# Patient Record
(demographics unavailable — no encounter records)

---

## 2020-03-30 NOTE — PHYS DOC
Past Medical History


Past Medical History:  Asthma, Bronchitis, Sinusitis


Past Surgical History:  No Surgical History


Smoking Status:  Current Every Day Smoker


Alcohol Use:  Occasionally


Drug Use:  None





Adult General


Chief Complaint


Chief Complaint:  HEADACHE





HPI


HPI





49-year-old female presents with report of headache 2 days. Reports has a 

history of mild headaches but never to this extent. Patient has tried 

over-the-counter remedies without improvement. Patient denies any fever or 

chills. Reports pain is primarily behind her right eye and radiates backwards. 

Denies visual changes. Denies neck pain. Denies pregnancy. Reports last 

menstrual period one week ago.





Review of Systems


Review of Systems





Constitutional: Denies fever or chills 


Eyes: Denies redness or eye pain 


HENT: Denies nasal congestion or sore throat


Respiratory: Denies cough or shortness of breath 


Cardiovascular: Denies chest pain or palpitations


GI: Denies abdominal pain, nausea, or vomiting


: Denies dysuria or hematuria


Musculoskeletal: Denies back pain or joint pain


Integument: Denies rash or skin lesions 


Neurologic: Reports headache; denies focal weakness or sensory changes





Complete systems were reviewed and found to be within normal limits, except as 

documented in this note.





Current Medications


Current Medications





Current Medications








 Medications


  (Trade)  Dose


 Ordered  Sig/Kareem  Start Time


 Stop Time Status Last Admin


Dose Admin


 


 Dexamethasone


 Sodium Phosphate


  (Decadron)  10 mg  1X  ONCE  3/30/20 01:00


 3/30/20 01:01 DC 3/30/20 01:48


10 MG


 


 Diphenhydramine


 HCl


  (Benadryl)  50 mg  1X  ONCE  3/30/20 01:00


 3/30/20 01:01 DC 3/30/20 01:49


50 MG


 


 Ketorolac


 Tromethamine


  (Toradol 15mg


 Vial)  15 mg  1X  ONCE  3/30/20 01:00


 3/30/20 01:01 DC 3/30/20 01:50


15 MG


 


 Metoclopramide HCl


  (Reglan Vial)  10 mg  1X  ONCE  3/30/20 01:00


 3/30/20 01:01 DC 3/30/20 01:51


10 MG


 


 Sodium Chloride  1,000 ml @ 


 1,000 mls/hr  1X  ONCE  3/30/20 01:00


 3/30/20 01:59 DC 3/30/20 01:30


1,000 MLS/HR











Allergies


Allergies





Allergies








Coded Allergies Type Severity Reaction Last Updated Verified


 


  Penicillins Allergy Intermediate ITCHY RASH 3/30/20 Yes


 


  clarithromycin Allergy Intermediate ITCHY RASH 3/30/20 Yes











Physical Exam


Physical Exam





Constitutional: Well developed, well nourished, uncomfortable, non-toxic 

appearance


HENT: Normocephalic, atraumatic, oropharynx moist


Eyes: PERRL, EOMI, conjunctiva normal, no discharge, no nystagmus


Neck: Normal range of motion, no tenderness, supple, no meningeal signs


Cardiovascular: Heart rate normal, regular rhythm


Lungs & Thorax:  Bilateral breath sounds clear to auscultation, no wheezing


Abdomen: Soft, no tenderness


Skin: Warm, dry, no erythema, no rash


Extremities: No tenderness, ROM intact, no edema


Neurologic: Alert and oriented X 3, normal motor function, normal sensory 

function, no focal deficits noted


Psychologic: Affect normal, judgment normal





Current Patient Data


Vital Signs





                                   Vital Signs








  Date Time  Temp Pulse Resp B/P (MAP) Pulse Ox O2 Delivery O2 Flow Rate FiO2


 


3/30/20 00:00 98.4 76 16 159/86 (110) 96 Room Air  





 98.4       











EKG


EKG


[]





Radiology/Procedures


Radiology/Procedures


[]





Course & Med Decision Making


Course & Med Decision Making





Patient presents with intractable headache. Patient neurologically intact. No 

history of trauma. Afebrile. No meningeal signs. Symptomatic treatment provided 

with interval improvement.





Patient stable for discharge with outpatient follow-up with PCP/neurologist. 

Neurology referral provided. Discussed findings and plan with patient, who 

acknowledges understanding and agreement.





Dragon Disclaimer


Dragon Disclaimer


This electronic medical record was generated, in whole or in part, using a voice

 recognition dictation system.





Departure


Departure


Impression:  


   Primary Impression:  


   Headache


Disposition:  01 HOME, SELF-CARE


Condition:  STABLE


Referrals:  


UNKNOWN PCP NAME (PCP)








PRINCE ORTIZ MD


Patient Instructions:  Headache, FAQs


Scripts


Butalb/Acetaminophen/Caffeine ([id number] -40) 1 Each Tablet


1 EACH PO Q6HRS PRN for HEADACHE, #14 TAB


   Prov: JESUS CODY DO         3/30/20





Problem Qualifiers








   Primary Impression:  


   Headache


   Headache type:  unspecified  Headache chronicity pattern:  acute headache  

   Intractability:  intractable  Qualified Codes:  R51 - Headache








JESUS CODY DO             Mar 30, 2020 02:20

## 2020-09-13 NOTE — PHYS DOC
Past Medical History


Past Medical History:  No Pertinent History, Bronchitis


Past Surgical History:  No Surgical History


Smoking Status:  Current Every Day Smoker


Alcohol Use:  Occasionally


Drug Use:  None





General Adult


EDM:


Chief Complaint:  Congestion





HPI:


HPI:





Patient is a 49  year old female with a history of bronchitis, current smoker 

who presents to the ED today complaining of nasal congestion and enlarged neck 

nodes on the right side of the neck that began 3 days ago.  Patient denies any 

fever.  She states she does not have any sore throat.  She states she has a 

chronic cough from bronchitis.  Denies any chest pain or shortness of breath.  

She states she typically has to take antibiotics to clear her infections 

otherwise over-the-counter remedies that she has tried are not working.





Review of Systems:


Review of Systems:


Constitutional:   Denies fever or chills. []


Eyes:   Denies change in visual acuity. []


HENT:   Reports nasal congestion and enlarged lymph nodes on the right side of 

the neck, denies sore throat. [] 


Respiratory:   Reports chronic cough, denies shortness of breath. [] 


Cardiovascular:   Denies chest pain or edema. [] 


GI:   Denies abdominal pain, nausea, vomiting, bloody stools or diarrhea. [] 


:  Denies dysuria. [] 


Musculoskeletal:   Denies back pain or joint pain. [] 


Integument:   Denies rash. [] 


Neurologic:   Denies headache, focal weakness or sensory changes. [] 


Psychiatric:  Denies depression or anxiety. []





Heart Score:


Risk Factors:


Risk Factors:  DM, Current or recent (<one month) smoker, HTN, HLP, family 

history of CAD, obesity.


Risk Scores:


Score 0 - 3:  2.5% MACE over next 6 weeks - Discharge Home


Score 4 - 6:  20.3% MACE over next 6 weeks - Admit for Clinical Observation


Score 7 - 10:  72.7% MACE over next 6 weeks - Early Invasive Strategies





Allergies:


Allergies:





Allergies








Coded Allergies Type Severity Reaction Last Updated Verified


 


  Penicillins Allergy Intermediate ITCHY RASH 3/30/20 Yes


 


  clarithromycin Allergy Intermediate ITCHY RASH 3/30/20 Yes











Physical Exam:


PE:





Constitutional: Well developed, well nourished, no acute distress, non-toxic 

appearance. []


HENT: Normocephalic, atraumatic, bilateral external ears normal, oropharynx 

moist, no oral exudates, nose normal. []


+2 anterior cervical adenopathy.


Mild frontal and maxillary sinus tenderness


Eyes: PERRLA, EOMI, conjunctiva normal, no discharge. [] 


Neck: Normal range of motion, no tenderness, supple, no stridor. [] 


Cardiovascular:Heart rate regular rhythm, no murmur []


Lungs & Thorax:  Bilateral breath sounds clear to auscultation []


Abdomen: Bowel sounds normal, soft, no tenderness, no masses, no pulsatile 

masses. [] 


Skin: Warm, dry, no erythema, no rash. [] 


Back: No tenderness, no CVA tenderness. [] 


Extremities: No tenderness, no cyanosis, no clubbing, ROM intact, no edema. [] 


Neurologic: Alert and oriented X 3, normal motor function, normal sensory 

function, no focal deficits noted. []


Psychologic: Affect normal, judgement normal, mood normal. []





Current Patient Data:


Vital Signs:





                                   Vital Signs








  Date Time  Temp Pulse Resp B/P (MAP) Pulse Ox O2 Delivery O2 Flow Rate FiO2


 


9/13/20 11:47 97.5 88 20 160/86 (110) 97 Room Air  





 97.5       











EKG:


EKG:


[]





Radiology/Procedures:


Radiology/Procedures:


[]





Course & Med Decision Making:


Course & Med Decision Making


Pertinent Labs and Imaging studies reviewed. (See chart for details)





This is a 49-year-old female patient presenting to the ED today with acute 

bronchitis, upper respiratory infection, lymphadenopathy on the right side.  

Patient requesting antibiotics, she states typically without antibiotics her 

infection does not clear up.  Patient was discharged on clindamycin and 

prednisone.  Encouraged to consider smoking cessation.  Follow-up with PCP in 1 

to 2 weeks.





Dragon Disclaimer:


Dragon Disclaimer:


This electronic medical record was generated, in whole or in part, using a voice

 recognition dictation system.





Departure


Departure


Impression:  


   Primary Impression:  


   Bronchitis


   Additional Impressions:  


   URI, acute


   Anterior cervical lymphadenopathy


   Smoking addiction


Disposition:  01 HOME, SELF-CARE


Condition:  STABLE


Referrals:  


UNKNOWN PCP NAME (PCP)


Follow-up with your doctor in 1 week


Patient Instructions:  Acute Bronchitis, Upper Respiratory Infection, Adult, 

Easy-to-Read





Additional Instructions:  


You were evaluated in the emergency room, take the prescribed medications as 

ordered.  Follow-up with your own doctor in 1 to 2 weeks


Scripts


Fluticasone Propionate (Flonase Allergy Relief) 9.9 Ml [email or web address]


2 SPRAYS NS DAILY, #1 BOTTLE


   Prov: FERNANDO MENDIETA         9/13/20 


Prednisone (PREDNISONE) 50 Mg Tablet


1 TAB PO DAILY, #5 TAB


   Prov: FERNANDO MENDIETA         9/13/20 


Clindamycin Hcl (CLINDAMYCIN HCL) 150 Mg Capsule


2 CAP PO TID, #60 CAP


   Prov: FERNANDO MENDIETA         9/13/20





Justicifation of Admission Dx:


Justifications for Admission:


Justification of Admission Dx:  N/A











FERNANDO EMNDIETA              Sep 13, 2020 12:18